# Patient Record
Sex: FEMALE | Race: WHITE | NOT HISPANIC OR LATINO | Employment: OTHER | URBAN - METROPOLITAN AREA
[De-identification: names, ages, dates, MRNs, and addresses within clinical notes are randomized per-mention and may not be internally consistent; named-entity substitution may affect disease eponyms.]

---

## 2021-03-19 DIAGNOSIS — M31.6 TEMPORAL ARTERITIS (H): Primary | ICD-10-CM

## 2021-03-19 DIAGNOSIS — Z11.59 ENCOUNTER FOR SCREENING FOR OTHER VIRAL DISEASES: ICD-10-CM

## 2021-03-20 DIAGNOSIS — Z11.59 ENCOUNTER FOR SCREENING FOR OTHER VIRAL DISEASES: ICD-10-CM

## 2021-03-20 LAB
SARS-COV-2 RNA RESP QL NAA+PROBE: NORMAL
SPECIMEN SOURCE: NORMAL

## 2021-03-20 PROCEDURE — U0005 INFEC AGEN DETEC AMPLI PROBE: HCPCS | Performed by: SURGERY

## 2021-03-20 PROCEDURE — U0003 INFECTIOUS AGENT DETECTION BY NUCLEIC ACID (DNA OR RNA); SEVERE ACUTE RESPIRATORY SYNDROME CORONAVIRUS 2 (SARS-COV-2) (CORONAVIRUS DISEASE [COVID-19]), AMPLIFIED PROBE TECHNIQUE, MAKING USE OF HIGH THROUGHPUT TECHNOLOGIES AS DESCRIBED BY CMS-2020-01-R: HCPCS | Performed by: SURGERY

## 2021-03-21 LAB
LABORATORY COMMENT REPORT: NORMAL
SARS-COV-2 RNA RESP QL NAA+PROBE: NEGATIVE
SPECIMEN SOURCE: NORMAL

## 2021-03-22 ENCOUNTER — TELEPHONE (OUTPATIENT)
Dept: OTHER | Facility: CLINIC | Age: 66
End: 2021-03-22

## 2021-03-22 RX ORDER — CEFAZOLIN SODIUM 2 G/100ML
2 INJECTION, SOLUTION INTRAVENOUS
Status: CANCELLED | OUTPATIENT
Start: 2021-03-22

## 2021-03-22 NOTE — TELEPHONE ENCOUNTER
[facility]    I called Anita Prince today to discuss her temporal artery biopsy further.  She has a history of PMR.  First noted approximately 2017 where she was treated for almost 18 months with steroids with improvement.  She now is developing very similar symptoms.  ESR= 54 and CRP= 1.46 several weeks ago.  Recent testing from last week are still pending.  Her physician started her on prednisone 50 mg daily 3/19/2021.  She is feeling better.      They requested temporal artery biopsy to rule out giant cell arteritis.  This was requested for 1 side only.  He does have some temporal pain intermittently on both sides.  We did discuss the possibility of doing the planned biopsy with frozen section and if this is negative to potentially biopsy the contralateral side for a better yield on the biopsy results and we will discuss this further tomorrow.    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  She will try to obtain the report and bring this with her.    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  She does have oral Cleocin will take this as a premedication prophylaxis prior to the biopsy tomorrow.    COVID-19 testing is negative from 3/20/2021.  This was also negative on 11/29/2020       Mega Cisneros MD

## 2021-03-23 ENCOUNTER — HOSPITAL ENCOUNTER (OUTPATIENT)
Facility: CLINIC | Age: 66
Discharge: HOME | End: 2021-03-23
Attending: SURGERY | Admitting: SURGERY
Payer: MEDICARE

## 2021-03-23 ENCOUNTER — APPOINTMENT (OUTPATIENT)
Dept: SURGERY | Facility: PHYSICIAN GROUP | Age: 66
End: 2021-03-23
Payer: MEDICARE

## 2021-03-23 VITALS
SYSTOLIC BLOOD PRESSURE: 137 MMHG | RESPIRATION RATE: 20 BRPM | DIASTOLIC BLOOD PRESSURE: 72 MMHG | OXYGEN SATURATION: 94 % | HEIGHT: 64 IN | WEIGHT: 221 LBS | BODY MASS INDEX: 37.73 KG/M2 | TEMPERATURE: 98.2 F

## 2021-03-23 DIAGNOSIS — M31.6 TEMPORAL ARTERITIS (H): ICD-10-CM

## 2021-03-23 LAB
GLUCOSE BLDC GLUCOMTR-MCNC: 181 MG/DL (ref 70–99)
POTASSIUM SERPL-SCNC: 3.4 MMOL/L (ref 3.4–5.3)

## 2021-03-23 PROCEDURE — 999N000141 HC STATISTIC PRE-PROCEDURE NURSING ASSESSMENT: Performed by: SURGERY

## 2021-03-23 PROCEDURE — 36415 COLL VENOUS BLD VENIPUNCTURE: CPT | Performed by: SURGERY

## 2021-03-23 PROCEDURE — 88305 TISSUE EXAM BY PATHOLOGIST: CPT | Mod: TC | Performed by: SURGERY

## 2021-03-23 PROCEDURE — 82962 GLUCOSE BLOOD TEST: CPT

## 2021-03-23 PROCEDURE — 84132 ASSAY OF SERUM POTASSIUM: CPT | Performed by: SURGERY

## 2021-03-23 PROCEDURE — 710N000012 HC RECOVERY PHASE 2, PER MINUTE: Performed by: SURGERY

## 2021-03-23 PROCEDURE — 88331 PATH CONSLTJ SURG 1 BLK 1SPC: CPT | Mod: TC | Performed by: SURGERY

## 2021-03-23 PROCEDURE — 88305 TISSUE EXAM BY PATHOLOGIST: CPT | Mod: 26 | Performed by: PATHOLOGY

## 2021-03-23 PROCEDURE — 250N000009 HC RX 250: Performed by: SURGERY

## 2021-03-23 PROCEDURE — 710N000010 HC RECOVERY PHASE 1, LEVEL 2, PER MIN: Performed by: SURGERY

## 2021-03-23 PROCEDURE — 272N000001 HC OR GENERAL SUPPLY STERILE: Performed by: SURGERY

## 2021-03-23 PROCEDURE — 88331 PATH CONSLTJ SURG 1 BLK 1SPC: CPT | Mod: 26 | Performed by: PATHOLOGY

## 2021-03-23 PROCEDURE — 360N000075 HC SURGERY LEVEL 2, PER MIN: Performed by: SURGERY

## 2021-03-23 RX ORDER — MOMETASONE FUROATE 1 MG/G
OINTMENT TOPICAL DAILY
COMMUNITY

## 2021-03-23 RX ORDER — ACETAMINOPHEN 325 MG/1
650 TABLET ORAL EVERY 4 HOURS PRN
Qty: 30 TABLET | Refills: 0 | Status: SHIPPED | OUTPATIENT
Start: 2021-03-23

## 2021-03-23 RX ORDER — CETIRIZINE HYDROCHLORIDE 10 MG/1
10 TABLET ORAL DAILY
COMMUNITY

## 2021-03-23 RX ORDER — ANTIARTHRITIC COMBINATION NO.2 900 MG
1 TABLET ORAL DAILY
COMMUNITY

## 2021-03-23 RX ORDER — IBUPROFEN 600 MG/1
600 TABLET, FILM COATED ORAL 3 TIMES DAILY PRN
Status: ON HOLD | COMMUNITY
End: 2022-10-27

## 2021-03-23 RX ORDER — NEOMYCIN SULFATE, POLYMYXIN B SULFATE, AND DEXAMETHASONE 3.5; 10000; 1 MG/G; [USP'U]/G; MG/G
0.5 OINTMENT OPHTHALMIC
COMMUNITY

## 2021-03-23 RX ORDER — LEVOTHYROXINE SODIUM 75 UG/1
75 TABLET ORAL DAILY
COMMUNITY

## 2021-03-23 RX ORDER — ALBUTEROL SULFATE 90 UG/1
2 AEROSOL, METERED RESPIRATORY (INHALATION) 4 TIMES DAILY PRN
COMMUNITY

## 2021-03-23 RX ORDER — ACETAMINOPHEN 325 MG/1
650 TABLET ORAL
Status: CANCELLED | OUTPATIENT
Start: 2021-03-23

## 2021-03-23 RX ORDER — BACLOFEN 10 MG/1
10 TABLET ORAL PRN
COMMUNITY

## 2021-03-23 RX ORDER — BUMETANIDE 2 MG/1
2 TABLET ORAL 2 TIMES DAILY
COMMUNITY

## 2021-03-23 RX ORDER — PREDNISONE 50 MG/1
50 TABLET ORAL DAILY
Status: ON HOLD | COMMUNITY
End: 2022-10-27

## 2021-03-23 RX ORDER — FLUTICASONE PROPIONATE AND SALMETEROL XINAFOATE 115; 21 UG/1; UG/1
2 AEROSOL, METERED RESPIRATORY (INHALATION) DAILY
Status: ON HOLD | COMMUNITY
End: 2022-10-27

## 2021-03-23 RX ORDER — EPINEPHRINE 0.3 MG/.3ML
0.3 INJECTION SUBCUTANEOUS PRN
COMMUNITY

## 2021-03-23 RX ORDER — FAMOTIDINE 20 MG
2000 TABLET ORAL DAILY
COMMUNITY

## 2021-03-23 RX ORDER — ATORVASTATIN CALCIUM 20 MG/1
20 TABLET, FILM COATED ORAL DAILY
Status: ON HOLD | COMMUNITY
End: 2022-10-27

## 2021-03-23 RX ORDER — NORETHINDRONE ACETATE AND ETHINYL ESTRADIOL .5; 2.5 MG/1; UG/1
TABLET ORAL WEEKLY
Status: ON HOLD | COMMUNITY
End: 2022-10-27

## 2021-03-23 RX ORDER — MAGNESIUM HYDROXIDE 1200 MG/15ML
LIQUID ORAL PRN
Status: DISCONTINUED | OUTPATIENT
Start: 2021-03-23 | End: 2021-03-23 | Stop reason: HOSPADM

## 2021-03-23 RX ORDER — CLINDAMYCIN HCL 300 MG
600 CAPSULE ORAL ONCE
COMMUNITY

## 2021-03-23 RX ORDER — ASPIRIN 81 MG/1
81 TABLET ORAL DAILY
COMMUNITY

## 2021-03-23 RX ORDER — SPIRONOLACTONE 25 MG/1
50 TABLET ORAL DAILY
COMMUNITY

## 2021-03-23 RX ORDER — BUPIVACAINE HYDROCHLORIDE 5 MG/ML
INJECTION, SOLUTION PERINEURAL PRN
Status: DISCONTINUED | OUTPATIENT
Start: 2021-03-23 | End: 2021-03-23 | Stop reason: HOSPADM

## 2021-03-23 RX ORDER — POTASSIUM CHLORIDE 1.5 G/1.58G
20 POWDER, FOR SOLUTION ORAL 2 TIMES DAILY
COMMUNITY

## 2021-03-23 ASSESSMENT — MIFFLIN-ST. JEOR: SCORE: 1527.45

## 2021-03-23 NOTE — BRIEF OP NOTE
[facility]    Brief Operative Note    Pre-operative diagnosis: Temporal arteritis (H) [M31.6]  Post-operative diagnosis Same as pre-operative diagnosis    Procedure: Procedure(s):  BILATERAL  TEMPORAL ARTERY BIOPSIES  Surgeon: Surgeon(s) and Role:     * Mega Cisneros - Primary     * Verito Zaman MD - Fellow - Assisting  Anesthesia: Local   Estimated blood loss: 5ml  Drains: None  Specimens:   ID Type Source Tests Collected by Time Destination   A : Left Temporal artery Tissue Artery, Temporal SURGICAL PATHOLOGY EXAM Mega Cisneros 3/23/2021  2:25 PM    B : Right Temporal artery Tissue Artery, Temporal SURGICAL PATHOLOGY EXAM Mega Cisneros 3/23/2021  3:07 PM      Findings:   No abnormalities noted..  Complications: None.  Implants: * No implants in log *

## 2021-03-23 NOTE — DISCHARGE INSTRUCTIONS
You may return to normal activity as tolerated.     48 hours after surgery, you may shower and wash your hair. If you have steri strips, leave them in place until they fall off by themselves (usually 7-10 days). If your incisions were closed by surgical glue (Dermabond), no special treatment is needed. Do not apply tape or lotions to incisions.     If you were given prescription pain medication, take as directed.  Otherwise you may use over the counter medications for pain.  If these are ineffective, please call your surgeon.      Do not drive if you are taking prescription strength pain medication.    May resume home medications as directed by your physician.    It is normal to have some temporary bruising, tenderness or numbness around your incision site. This should resolve after several days.    Follow up with your     Call your doctor if you experience any of the following:  -uncontrolled pain, bleeding or vomiting  -fever greater than 101.5 F  -purulent discharge (pus) coming from the wound  -increasing redness around the wound

## 2021-03-24 ENCOUNTER — TELEPHONE (OUTPATIENT)
Dept: OTHER | Facility: CLINIC | Age: 66
End: 2021-03-24

## 2021-03-24 LAB — COPATH REPORT: NORMAL

## 2021-03-24 NOTE — OP NOTE
Procedure Date: 03/23/2021      PREOPERATIVE DIAGNOSIS:  Temporal artery discomfort -- rule out giant cell arteritis.      POSTOPERATIVE DIAGNOSIS:  Temporal artery discomfort -- rule out giant cell arteritis.      PROCEDURE:   1.  Left temporal artery excisional biopsy with frozen section.   2.  Right temporal artery excisional biopsy.      SURGEON:  Mega Cisneros MD      ASSISTANTS: Suellen Pitts MD ([facility] resident) and Verito Zaman MD (Vascular fellow)      ANESTHESIA:  Local.      PREOPERATIVE MEDICATIONS:  Cleocin 900 mg orally.      INDICATIONS:  A 65-year-old patient has a history of polymyalgia rheumatica.  She was treated with prednisone years ago and is now having similar symptoms with elevated ESR and CRP.  She is having headaches and discomfort.  She was initiated on prednisone 50 mg daily 4 days ago, with some improvement.  It was requested that we perform a left temporal artery biopsy.  After discussion with the patient, we decided that if the temporal artery biopsy frozen section is negative on the left, then we would do the right temporal artery to increase the diagnostic yield for giant cell arteritis.  She has easily palpable temporal artery pulses bilaterally.  She comes to the operating room today under informed consent.      DESCRIPTION OF PROCEDURE:  The patient was brought to the operating room.  She was placed in a partial sitting position.  Both temporal arteries were easily palpable.      LEFT TEMPORAL ARTERY BIOPSY:  We prepped the left temporal area and ear with ChloraPrep.  After 3 minutes, drapes were applied.  This was anesthetized with 1% lidocaine.  A vertical incision was made from the hairline to the lower portion of the ear.  Dissection was carried with electrocautery until we identified a small temporal artery.  This did not appear to have any inflammatory changes.  Multiple small branches were divided between 4-0 silk suture, and this was mobilized for a length of  approximately 3.5 cm.  This was ligated proximally and distally with 3-0 silk suture and transected and sent to Pathology.  Wound was infiltrated with 0.5% Marcaine.  Subcutaneous tissue was closed with interrupted 3-0 Monocryl.  (ALLERGIC REACTION TO VICRYL IN THE PAST), and the skin was closed with 4-0 Monocryl in a subcuticular fashion, followed by Steri-Strips.  Frozen section returned no evidence of arteritis.      RIGHT TEMPORAL ARTERY EXCISIONAL BIOPSY:  We then prepped the right side of the temple area and ear in a similar fashion.  This was again anesthetized with 1% lidocaine.  A vertical incision was made.  Dissection was carried down to identify the temporal vein.  The artery was located posterior to this, and the vein was mobilized.  Again, multiple small branches were ligated on the artery with 4-0 silk suture, and this was mobilized for a length of approximately 3 cm, ligated proximally and distally with 3-0 silk sutures.  This specimen was divided and sent to Pathology for permanent pathological evaluation.  Wound was infiltrated with 0.5% Marcaine for postoperative analgesia.  Subcutaneous tissue was closed with interrupted 4-0 Monocryl and skin with 4-0 Monocryl in subcuticular fashion, followed by a Steri-Strip.      The patient tolerated both procedures well.      ESTIMATED BLOOD LOSS:  Less than 1 mL.      COMPLICATIONS:  None.      The patient will continue on her prednisone.  We will call her when the final pathology results are available in approximately 48 hours.         JOHN MOMIN MD             D: 2021   T: 2021   MT: FAVIAN      Name:     NICOLE WALSH   MRN:      [id number]        Account:        [id number]   :      1955           Procedure Date: 2021      Document: [id number]

## 2021-03-24 NOTE — TELEPHONE ENCOUNTER
[facility]    I called Anitakg Prince about her Temporal artery biopsies yesterday.  She is doing well.    Final Path-- no evidence of GCA (normal).      Mega Cisneros MD

## 2021-03-25 NOTE — RESULT ENCOUNTER NOTE
Called patient with Negative GCA results.  She is doing very well from the biopsy.  Still with some headaches last evening.  For now should continue on the prednisone follow-up with her physicians.       Mega Cisneros MD

## 2021-04-03 ENCOUNTER — HEALTH MAINTENANCE LETTER (OUTPATIENT)
Age: 66
End: 2021-04-03

## 2021-09-18 ENCOUNTER — HEALTH MAINTENANCE LETTER (OUTPATIENT)
Age: 66
End: 2021-09-18

## 2022-04-30 ENCOUNTER — HEALTH MAINTENANCE LETTER (OUTPATIENT)
Age: 67
End: 2022-04-30

## 2022-10-24 ENCOUNTER — TRANSFERRED RECORDS (OUTPATIENT)
Dept: MULTI SPECIALTY CLINIC | Facility: CLINIC | Age: 67
End: 2022-10-24

## 2022-10-24 LAB
ALBUMIN (URINE) MG/SPEC: <6 MG/L
ALT SERPL-CCNC: 28 U/L (ref 14–63)
AST SERPL-CCNC: 26 U/L (ref 15–37)
CHOLESTEROL (EXTERNAL): 197 MG/DL (ref 100–200)
CREATININE (EXTERNAL): 0.96 MG/DL (ref 0.51–0.95)
CREATININE (URINE): 114 MG/DL (ref 29–226)
GFR ESTIMATED (EXTERNAL): >60 ML/MIN/1.73M2
GLUCOSE (EXTERNAL): 140 MG/DL (ref 74–100)
HBA1C MFR BLD: 6.3 % (ref 4.8–5.6)
HDLC SERPL-MCNC: 72 MG/DL (ref 45–60)
LDL CHOLESTEROL (EXTERNAL): 94 MG/DL
NON HDL CHOLESTEROL (EXTERNAL): 125 MG/DL
POTASSIUM (EXTERNAL): 3.7 MMOL/L (ref 3.5–5.1)
TRIGLYCERIDES (EXTERNAL): 154 MG/DL (ref 35–150)
TSH SERPL-ACNC: 0.57 UIU/ML (ref 0.35–3.74)

## 2022-10-26 ENCOUNTER — ANESTHESIA EVENT (OUTPATIENT)
Dept: SURGERY | Facility: CLINIC | Age: 67
End: 2022-10-26
Payer: MEDICARE

## 2022-10-26 RX ORDER — ALLOPURINOL 100 MG/1
100 TABLET ORAL DAILY
COMMUNITY

## 2022-10-26 RX ORDER — ROSUVASTATIN CALCIUM 5 MG/1
5 TABLET, COATED ORAL DAILY
COMMUNITY

## 2022-10-26 RX ORDER — FLUCONAZOLE 150 MG/1
150 TABLET ORAL ONCE
COMMUNITY

## 2022-10-26 RX ORDER — PREDNISOLONE 5 MG/1
5 TABLET ORAL DAILY
COMMUNITY

## 2022-10-27 ENCOUNTER — HOSPITAL ENCOUNTER (OUTPATIENT)
Facility: CLINIC | Age: 67
Discharge: HOME | End: 2022-10-27
Attending: OPHTHALMOLOGY | Admitting: OPHTHALMOLOGY
Payer: MEDICARE

## 2022-10-27 ENCOUNTER — ANESTHESIA (OUTPATIENT)
Dept: SURGERY | Facility: CLINIC | Age: 67
End: 2022-10-27
Payer: MEDICARE

## 2022-10-27 VITALS
WEIGHT: 204.7 LBS | HEIGHT: 64 IN | BODY MASS INDEX: 34.95 KG/M2 | DIASTOLIC BLOOD PRESSURE: 70 MMHG | RESPIRATION RATE: 18 BRPM | OXYGEN SATURATION: 97 % | SYSTOLIC BLOOD PRESSURE: 125 MMHG | HEART RATE: 80 BPM | TEMPERATURE: 96.7 F

## 2022-10-27 LAB
ANION GAP SERPL CALCULATED.3IONS-SCNC: 9 MMOL/L (ref 3–14)
BUN SERPL-MCNC: 23 MG/DL (ref 7–30)
CALCIUM SERPL-MCNC: 8.9 MG/DL (ref 8.5–10.1)
CHLORIDE BLD-SCNC: 105 MMOL/L (ref 94–109)
CO2 SERPL-SCNC: 26 MMOL/L (ref 20–32)
CREAT SERPL-MCNC: 0.85 MG/DL (ref 0.52–1.04)
GFR SERPL CREATININE-BSD FRML MDRD: 75 ML/MIN/1.73M2
GLUCOSE BLD-MCNC: 157 MG/DL (ref 70–99)
POTASSIUM BLD-SCNC: 3 MMOL/L (ref 3.4–5.3)
SODIUM SERPL-SCNC: 140 MMOL/L (ref 133–144)

## 2022-10-27 PROCEDURE — 36415 COLL VENOUS BLD VENIPUNCTURE: CPT | Performed by: ANESTHESIOLOGY

## 2022-10-27 PROCEDURE — 999N000141 HC STATISTIC PRE-PROCEDURE NURSING ASSESSMENT: Performed by: OPHTHALMOLOGY

## 2022-10-27 PROCEDURE — 250N000009 HC RX 250: Performed by: OPHTHALMOLOGY

## 2022-10-27 PROCEDURE — 710N000012 HC RECOVERY PHASE 2, PER MINUTE: Performed by: OPHTHALMOLOGY

## 2022-10-27 PROCEDURE — 250N000011 HC RX IP 250 OP 636

## 2022-10-27 PROCEDURE — 272N000001 HC OR GENERAL SUPPLY STERILE: Performed by: OPHTHALMOLOGY

## 2022-10-27 PROCEDURE — 710N000009 HC RECOVERY PHASE 1, LEVEL 1, PER MIN: Performed by: OPHTHALMOLOGY

## 2022-10-27 PROCEDURE — 80048 BASIC METABOLIC PNL TOTAL CA: CPT | Performed by: ANESTHESIOLOGY

## 2022-10-27 PROCEDURE — 258N000003 HC RX IP 258 OP 636

## 2022-10-27 PROCEDURE — 250N000009 HC RX 250

## 2022-10-27 PROCEDURE — 370N000017 HC ANESTHESIA TECHNICAL FEE, PER MIN: Performed by: OPHTHALMOLOGY

## 2022-10-27 PROCEDURE — 360N000076 HC SURGERY LEVEL 3, PER MIN: Performed by: OPHTHALMOLOGY

## 2022-10-27 RX ORDER — TETRACAINE HYDROCHLORIDE 5 MG/ML
SOLUTION OPHTHALMIC
Status: DISCONTINUED
Start: 2022-10-27 | End: 2022-10-27 | Stop reason: HOSPADM

## 2022-10-27 RX ORDER — LIDOCAINE HCL/EPINEPHRINE/PF 2%-1:200K
VIAL (ML) INJECTION PRN
Status: DISCONTINUED | OUTPATIENT
Start: 2022-10-27 | End: 2022-10-27 | Stop reason: HOSPADM

## 2022-10-27 RX ORDER — BALANCED SALT SOLUTION 6.4; .75; .48; .3; 3.9; 1.7 MG/ML; MG/ML; MG/ML; MG/ML; MG/ML; MG/ML
SOLUTION OPHTHALMIC PRN
Status: DISCONTINUED | OUTPATIENT
Start: 2022-10-27 | End: 2022-10-27 | Stop reason: HOSPADM

## 2022-10-27 RX ORDER — ERYTHROMYCIN 5 MG/G
OINTMENT OPHTHALMIC PRN
Status: DISCONTINUED | OUTPATIENT
Start: 2022-10-27 | End: 2022-10-27 | Stop reason: HOSPADM

## 2022-10-27 RX ORDER — DEXAMETHASONE SODIUM PHOSPHATE 4 MG/ML
INJECTION, SOLUTION INTRA-ARTICULAR; INTRALESIONAL; INTRAMUSCULAR; INTRAVENOUS; SOFT TISSUE PRN
Status: DISCONTINUED | OUTPATIENT
Start: 2022-10-27 | End: 2022-10-27

## 2022-10-27 RX ORDER — TETRACAINE HYDROCHLORIDE 5 MG/ML
SOLUTION OPHTHALMIC PRN
Status: DISCONTINUED | OUTPATIENT
Start: 2022-10-27 | End: 2022-10-27 | Stop reason: HOSPADM

## 2022-10-27 RX ORDER — LIDOCAINE HCL/EPINEPHRINE/PF 2%-1:200K
VIAL (ML) INJECTION
Status: DISCONTINUED
Start: 2022-10-27 | End: 2022-10-27 | Stop reason: HOSPADM

## 2022-10-27 RX ORDER — BALANCED SALT SOLUTION 6.4; .75; .48; .3; 3.9; 1.7 MG/ML; MG/ML; MG/ML; MG/ML; MG/ML; MG/ML
SOLUTION OPHTHALMIC
Status: DISCONTINUED
Start: 2022-10-27 | End: 2022-10-27 | Stop reason: HOSPADM

## 2022-10-27 RX ORDER — ERYTHROMYCIN 5 MG/G
OINTMENT OPHTHALMIC
Status: DISCONTINUED
Start: 2022-10-27 | End: 2022-10-27 | Stop reason: HOSPADM

## 2022-10-27 RX ORDER — PROPOFOL 10 MG/ML
INJECTION, EMULSION INTRAVENOUS PRN
Status: DISCONTINUED | OUTPATIENT
Start: 2022-10-27 | End: 2022-10-27

## 2022-10-27 RX ORDER — SODIUM CHLORIDE, SODIUM LACTATE, POTASSIUM CHLORIDE, CALCIUM CHLORIDE 600; 310; 30; 20 MG/100ML; MG/100ML; MG/100ML; MG/100ML
INJECTION, SOLUTION INTRAVENOUS CONTINUOUS PRN
Status: DISCONTINUED | OUTPATIENT
Start: 2022-10-27 | End: 2022-10-27

## 2022-10-27 RX ORDER — ONDANSETRON 2 MG/ML
INJECTION INTRAMUSCULAR; INTRAVENOUS PRN
Status: DISCONTINUED | OUTPATIENT
Start: 2022-10-27 | End: 2022-10-27

## 2022-10-27 RX ORDER — LIDOCAINE HYDROCHLORIDE 20 MG/ML
INJECTION, SOLUTION INFILTRATION; PERINEURAL PRN
Status: DISCONTINUED | OUTPATIENT
Start: 2022-10-27 | End: 2022-10-27

## 2022-10-27 RX ADMIN — MIDAZOLAM 2 MG: 1 INJECTION INTRAMUSCULAR; INTRAVENOUS at 07:33

## 2022-10-27 RX ADMIN — PROPOFOL 20 MG: 10 INJECTION, EMULSION INTRAVENOUS at 07:53

## 2022-10-27 RX ADMIN — SODIUM CHLORIDE, POTASSIUM CHLORIDE, SODIUM LACTATE AND CALCIUM CHLORIDE: 600; 310; 30; 20 INJECTION, SOLUTION INTRAVENOUS at 07:33

## 2022-10-27 RX ADMIN — PROPOFOL 40 MG: 10 INJECTION, EMULSION INTRAVENOUS at 07:37

## 2022-10-27 RX ADMIN — PROPOFOL 10 MG: 10 INJECTION, EMULSION INTRAVENOUS at 07:41

## 2022-10-27 RX ADMIN — PROPOFOL 10 MG: 10 INJECTION, EMULSION INTRAVENOUS at 07:43

## 2022-10-27 RX ADMIN — DEXAMETHASONE SODIUM PHOSPHATE 4 MG: 4 INJECTION, SOLUTION INTRA-ARTICULAR; INTRALESIONAL; INTRAMUSCULAR; INTRAVENOUS; SOFT TISSUE at 07:39

## 2022-10-27 RX ADMIN — LIDOCAINE HYDROCHLORIDE 40 MG: 20 INJECTION, SOLUTION INFILTRATION; PERINEURAL at 07:37

## 2022-10-27 RX ADMIN — ONDANSETRON 4 MG: 2 INJECTION INTRAMUSCULAR; INTRAVENOUS at 07:39

## 2022-10-27 RX ADMIN — PROPOFOL 10 MG: 10 INJECTION, EMULSION INTRAVENOUS at 07:40

## 2022-10-27 ASSESSMENT — COPD QUESTIONNAIRES: COPD: 1

## 2022-10-27 ASSESSMENT — ACTIVITIES OF DAILY LIVING (ADL)
ADLS_ACUITY_SCORE: 35
ADLS_ACUITY_SCORE: 35

## 2022-10-27 NOTE — ANESTHESIA CARE TRANSFER NOTE
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    Anesthesia Type:   MAC     Note:    Oropharynx: oropharynx clear of all foreign objects  Level of Consciousness: awake  Oxygen Supplementation: room air    Independent Airway: airway patency satisfactory and stable  Dentition: dentition unchanged  Vital Signs Stable: post-procedure vital signs reviewed and stable  Report to RN Given: handoff report given  Patient transferred to: PACU    Handoff Report: Identifed the Patient, Identified the Reponsible Provider, Reviewed the pertinent medical history, Discussed the surgical course, Reviewed Intra-OP anesthesia mangement and issues during anesthesia, Set expectations for post-procedure period and Allowed opportunity for questions and acknowledgement of understanding      Vitals:  Vitals Value Taken Time   BP     Temp     Pulse     Resp     SpO2         Electronically Signed By: NA Daniels CRNA  October 27, 2022  8:14 AM

## 2022-10-27 NOTE — ANESTHESIA POSTPROCEDURE EVALUATION
Patient: Anita Prince    Procedure: Procedure(s):  INTERNAL PTOSIS REPAIR BILATERAL UPPER LIDS       Anesthesia Type:  MAC    Note:  Disposition: Outpatient   Postop Pain Control: Uneventful            Sign Out: Well controlled pain   PONV: No   Neuro/Psych: Uneventful            Sign Out: Acceptable/Baseline neuro status   Airway/Respiratory: Uneventful            Sign Out: Acceptable/Baseline resp. status   CV/Hemodynamics: Uneventful            Sign Out: Acceptable CV status; No obvious hypovolemia; No obvious fluid overload   Other NRE: NONE   DID A NON-ROUTINE EVENT OCCUR? No           Last vitals:  Vitals Value Taken Time   /67 10/27/22 0900   Temp 35.9  C (96.7  F) 10/27/22 0815   Pulse 79 10/27/22 0901   Resp 22 10/27/22 0901   SpO2 97 % 10/27/22 0900   Vitals shown include unvalidated device data.    Electronically Signed By: Josué Reeves MD  October 27, 2022  1:55 PM

## 2022-10-27 NOTE — OP NOTE
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 On exam the patient is noted to have myogenic ptosis.  The patient has good levator function and lagophthalmos. Risks, benefits, and alternatives were discussed. Potential complications were also discussed. The patient understood and wished to proceed.    After obtaining informed consent the patient was brought to the ambulatory surgery center where the patient was placed on the surgical table in the supine position. Using IV standby local anesthetic was infiltrated. A mixture of 2% lidocaine with epinephrine and 0.5% Marcaine with epinephrine was used. Patient was prepped and draped in the usual sterile fashion.    The procedure was performed in a symmetric fashion and would be described for one side. In this procedure, a 6-0 plain suture was tied laterally to the orbicularis oculi muscle through the skin and then passed through the lid and out the palpebral conjunctiva laterally. A 4-0 silk suture was placed 3-4 mm posterior to the margin to provide traction for the upper lid. The upper eyelid was everted on a Desmarres eyelid retractor. Marks were placed 1/3 and 2/13 the distance of the tarsus along the tarsoconjunctival edge. From those marks, two additional marks where placed going onto the conjunctiva 4 mm for the right and 4 mm for the left, equalling a resection of 8 mm for the right and 8 mm for the left in total. Two locking forceps where placed at the marked conjunctival  locations and lifted. A Putterman ptosis clamp was used to grab the conjunctiva and Gong's muscle at the preplaced marks.  The 6-0 Plain suture was sutured in a continuous fashion from lateral to medial directly underneath the Putterman clamp.  A #15 Bard-Hugh blade was used to resect the conjunctiva and Gong's muscle which was held in the Putterman clamp by carefully passing it directly underneath the clamp but over the suture.  After the resection was completed, the 6-0 plain suture was checked to make sure that it was still intact.  Then, it was passed anteriorly through the eyelid medially and secured to the superficial orbicularis oculi muscle through the skin. The same procedure was performed on the contralateral eye without complications.    The patient's face was cleaned and antibiotic ointment was placed onto the wound. The patient tolerated the procedure well and was taken to the recovery area in good condition. Following the recovery room protocol, the patient was discharged in the presence of a responsible adult.

## 2022-10-27 NOTE — PROVIDER NOTIFICATION
Pt worried about Tylenol intake and meds she already takes.  Wanted me to talk to surgeon about ointment, drops and pain relief.  Surgeon states she can take the maxitrol drops now, use ice, and tylenol.  Surgeon states will call tomorrow to see how pain is.  Patient took her own maxitrol.

## 2022-10-27 NOTE — DISCHARGE INSTRUCTIONS
POST-SURGERY INSTRUCTIONS - STANDARD  DR BRYCE ROMO    NAME:       :          POST-OPERATIVE APPOINTMENTS:       DAYS ONE & TWO POST-SURGERY  [Please follow these instructions for 48 hours after surgery]    Bruising and Swelling: Remember that swelling and bruising can increase for the first 24-48 hours. While you should expect swelling, keeping swelling to a minimum will optimize your healing results.    Positioning: Remain in a reclined position (30-45 degrees) for the first 48 hours after surgery. Keeping the head above the heart is important to reduce swelling and shortens recovery time.    Activity: For the first 48 hours, we recommend you keep your activity to a minimum, only getting up to use the restroom and sitting up for meals. Keeping the heart rate and blood pressure low will reduce swelling and bruising.    Cold Compresses: Put 4x4 gauze pads (do not need to be sterile) in a bowl of cold tap water and keep in the refrigerator. Remove from fridge, place ice cubes into bowl of water to help keep cool and place next to you for easy access. When ready to use, wring out gauze pad and place one over each operative eye. Leave in place for 15 minutes on, then 15 minutes off for 48 hours. (Goal: apply cool for duration of 15 minutes, if your gauze is warming up before 15 minutes, replace warm gauze with new cool gauze to complete the 15 minute duration). You do not have to use them at night. Use new gauze each time. Cold gauze pads help soothe pain and itching and keep swelling to a minimum.   (Do NOT use ice packs as these are too cold and too heavy for the sensitive skin around the eyes.)      Preventing Infection:  Use the prescribed ophthalmic ointment over the surgical site and inside the eye (only when directed for inside the eye). Apply the ointment with a Q-tip. Use a rolling motion to cover any exposed stitches. If surgery is done on inside of the eyelid and instructed by doctor's office place a  small dab of the ointment about the size of a grain of rice, in the inner corner of the eye. The heat of the eye and blinking motion will melt the ointment and draw it across the whole eye. It is normal for the vision to be blurry while using the ointment as it is a thick consistency. Use the ointment 4 times a day: breakfast, lunch, dinner, and bedtime, for 7 days.    Eye drops: Use preservative-free lubricating eye drops at least 4 times a day for 48 hours, increase frequency as needed for your comfort. These drops can be used as often as you like to alleviate any dry eye, itchiness, foreign-body sensation, brunilda feeling, etc. Avoid using lubricating eye drops with preservatives, as they can irritate the eye after surgery.     Itch: Surgical site itch is a normal post-operative expectation. Cold compresses and ointment can help. Over the counter diphenhydramine (Benadryl) taken as the box directed can also assist.    Pain Medication: Pain after this procedure is generally well controlled with extra-strength Acetaminophen (Tylenol). Take Acetaminophen as directed. Do not exceed the acetaminophen maximum total daily dose (4000 mg/day). Do not take more than one product that contains acetaminophen at any given time. Examples of prescription products that contain acetaminophen include hydrocodone with acetaminophen (Vicodin), oxycodone with acetaminophen (Percocet) and acetaminophen with codeine (Tylenol #3). Avoid other pain medications that could cause bleeding such as aspirin, Ibuprofen (Advil) and Naproxen (Aleve) unless otherwise directed by your doctor.               (Continued on reverse)  Diet: For the first two days after surgery, avoid foods that require excessive chewing, such as steak or bagels. Excessive chewing can stress the sutures.  No alcohol for 24 hours post-surgery. The combination of anesthesia and alcohol may cause nausea and vomiting.     Showering: You may resume showering 1 day after your  surgery. It is okay for the surgical site to get wet but avoid direct forceful pressure from the shower touching the area.  A clean wound is a happy wound, so please make sure to keep the surgical site clean and free of dried drainage. We recommend using a Q-tip dipped in warm water to clean around the eyes. Make sure to use a gentle rolling motion to avoid disrupting the sutures and incisions.      Arnica (optional): Resume taking Arnica for 4 days following your surgery. Take as directed on bottle.    WHAT YOU CAN EXPECT  Dry eyes (for about one month)  Itchiness (in and around your stitches)  Tightness (from swelling)  Red tinted tears (for the first few days)  Blurry vision (from the ointment and dry eyes)  Bruising (typically lasts 2-3 weeks)  One side more swollen or bruised than the other         WHAT YOU SHOULD NOT EXPERIENCE  Severe pain  Severe bleeding  Intolerable itching not relieved by cool compresses    These symptoms are rare, but if you do experience them, call our office at [phone].  Someone is available 24 hours a day. If it is after business hours, you will reach an answering service that will page the doctor on call.     FOR TWO WEEKS POST-SURGERY  Continue using preservative free lubricating drops 4 times a day or more as needed for two weeks.   Use cold compresses as needed for your comfort for up to 10 days after surgery.  Do not use Aspirin, Ibuprofen (Advil), Naproxen (Aleve) or other anticoagulants unless otherwise directed by your physician. Resume vitamins and supplements 7 days after surgery.  Keep swelling to a minimum:  -Avoid activity that will elevate your heart rate such as gym activity, running, climbing stairs, etc.   (It is okay to take a leisurely paced walk starting day 3 after surgery)  -Do not lift objects greater than 10 pounds.  -Do not bend down at the waist to lift objects, golf, garden,  children/pets.  To reduce the risk of infection:  -Do not wear soft  contact lenses for 2 weeks. Do not use hard contacts lenses for a period of 4 weeks.  -Do not wear makeup anywhere near the surgical site, wash hands often.  Minimize sun exposure to surgical site for 1 year minimum. Seek shade during peak hours (10 AM - 4 PM), wear a hat (a wide brim hat that protects your full face), UV-absorbent sunglasses (block both UVA and UVB rays), use sunscreen (SPF 30+, as directed on bottle).            Same Day Surgery Discharge Instructions for  Sedation and General Anesthesia     It's not unusual to feel dizzy, light-headed or faint for up to 24 hours after surgery or while taking pain medication.  If you have these symptoms: sit for a few minutes before standing and have someone assist you when you get up to walk or use the bathroom.    You should rest and relax for the next 24 hours. We recommend you make arrangements to have an adult stay with you for at least 24 hours after your discharge.  Avoid hazardous and strenuous activity.    DO NOT DRIVE any vehicle or operate mechanical equipment for 24 hours following the end of your surgery.  Even though you may feel normal, your reactions may be affected by the medication you have received.    Do not drink alcoholic beverages for 24 hours following surgery.     Slowly progress to your regular diet as you feel able. It's not unusual to feel nauseated and/or vomit after receiving anesthesia.  If you develop these symptoms, drink clear liquids (apple juice, ginger ale, broth, 7-up, etc. ) until you feel better.  If your nausea and vomiting persists for 24 hours, please notify your surgeon.      All narcotic pain medications, along with inactivity and anesthesia, can cause constipation. Drinking plenty of liquids and increasing fiber intake will help.    For any questions of a medical nature, call your surgeon.    Do not make important decisions for 24 hours.    If you had general anesthesia, you may have a sore throat for a couple of days  related to the breathing tube used during surgery.  You may use Cepacol lozenges to help with this discomfort.  If it worsens or if you develop a fever, contact your surgeon.     If you feel your pain is not well managed with the pain medications prescribed by your surgeon, please contact your surgeon's office to let them know so they can address your concerns.           **If you have questions or concerns about your procedure,   call Dr. Bailey at .*

## 2022-10-27 NOTE — ANESTHESIA PREPROCEDURE EVALUATION
Anesthesia Pre-Procedure Evaluation    Patient: Anita Prince   MRN: [id number] : 1955        Procedure : Procedure(s):  INTERNAL PTOSIS REPAIR BILATERAL UPPER LIDS          Past Medical History:   Diagnosis Date     ASD (atrial septal defect)      Bilateral carpal tunnel syndrome      Bilateral leg edema      CAD (coronary artery disease)      COPD (chronic obstructive pulmonary disease) (H)      Diabetes (H)     type II d/t predinsone     Drug-induced osteopenia      DVT (deep venous thrombosis) (H)     LOWER EXT     Elevated blood uric acid level      HLD (hyperlipidemia)      HTN (hypertension)      Hypothyroidism      Irritable bowel syndrome      Lid lag      Madden neuroma      Obesity      PFO (patent foramen ovale)      Plaque psoriasis      Positive P-ANCA titer      Primary immunodeficiency disorder (H)      Rosacea      Shortness of breath      Thyroid disease      Type 2 diabetes mellitus (H)      Uncomplicated asthma      Yeast infection of the vagina       Past Surgical History:   Procedure Laterality Date     ARTHROPLASTY HIP       BIOPSY      R. excisional breast biopsy     BIOPSY ARTERY TEMPORAL Bilateral 2021    Procedure: BILATERAL  TEMPORAL ARTERY BIOPSIES;  Surgeon: Mega Cisneros;  Location: [facility]     COLPOSCOPY       ENT SURGERY      sinus surgery x3     ENT SURGERY  8    tonsillectomy and adenoidectomy     NASAL SEPTUM SURGERY       ORTHOPEDIC SURGERY  2001    R. hip replacement     ORTHOPEDIC SURGERY  2016    right rotator cuff repair     ORTHOPEDIC SURGERY  2017, 2018    14 incision for Dupetyns, carpal tunnel      ORTHOPEDIC SURGERY      madden's neurmoma removed left foot     SINUS SURGERY       SOFT TISSUE SURGERY      exicision of histocytomas     TONSILLECTOMY       TOOTH EXTRACTION        Allergies   Allergen Reactions     Canagliflozin      Ether      Dizziness, passing out     Formaldehyde      Dizziness, passing out     Gadolinium      Gadolinium  containing contrast media-can't take  Vasovagal reaction     Hydrocodone Itching     Indomethacin      Severely depressed     Metformin      Bloody diarrhea     Suture      Ethicon-yellow/gray drainage, didn't heal well    Tolerated clear nylon filament     Exenatide Rash     Insulin Detemir Rash     Losartan Rash     Oxycodone Itching and Rash     Semaglutide Rash     Sitagliptin Rash     Sulfamethoxazole-Trimethoprim Rash     Torsemide Rash      Social History     Tobacco Use     Smoking status: Former     Packs/day: 0.50     Types: Cigarettes     Quit date: 1/1/2019     Years since quitting: 3.8     Smokeless tobacco: Never     Tobacco comments:     0.5ppd x30 years   Substance Use Topics     Alcohol use: Not Currently      Wt Readings from Last 1 Encounters:   10/27/22 92.9 kg (204 lb 11.2 oz)        Anesthesia Evaluation   Pt has had prior anesthetic.         ROS/MED HX  ENT/Pulmonary:     (+) asthma COPD,     Neurologic:       Cardiovascular:     (+) hypertension--CAD ---congenital heart disease remote ASD.     METS/Exercise Tolerance:     Hematologic:       Musculoskeletal:       GI/Hepatic:       Renal/Genitourinary:       Endo:     (+) type II DM, thyroid problem, Obesity,     Psychiatric/Substance Use:       Infectious Disease:       Malignancy:       Other:            Physical Exam    Airway        Mallampati: II    Neck ROM: full     Respiratory Devices and Support         Dental       (+) caps      Cardiovascular   cardiovascular exam normal          Pulmonary   pulmonary exam normal                OUTSIDE LABS:  CBC: No results found for: WBC, HGB, HCT, PLT  BMP:   Lab Results   Component Value Date    POTASSIUM 3.4 03/23/2021     COAGS: No results found for: PTT, INR, FIBR  POC:   Lab Results   Component Value Date     (H) 03/23/2021     HEPATIC: No results found for: ALBUMIN, PROTTOTAL, ALT, AST, GGT, ALKPHOS, BILITOTAL, BILIDIRECT, GATO  OTHER: No results found for: PH, LACT, A1C, MONIK, PHOS,  MAG, LIPASE, AMYLASE, TSH, T4, T3, CRP, SED    Anesthesia Plan    ASA Status:  3   NPO Status:  NPO Appropriate    Anesthesia Type: MAC.     - Reason for MAC: immobility needed, straight local not clinically adequate              Consents    Anesthesia Plan(s) and associated risks, benefits, and realistic alternatives discussed. Questions answered and patient/representative(s) expressed understanding.    - Discussed:     - Discussed with:  Patient         Postoperative Care    Pain management: IV analgesics.   PONV prophylaxis: Ondansetron (or other 5HT-3)     Comments:                Josué Reeves MD

## 2022-11-19 ENCOUNTER — HEALTH MAINTENANCE LETTER (OUTPATIENT)
Age: 67
End: 2022-11-19

## 2023-04-15 ENCOUNTER — HEALTH MAINTENANCE LETTER (OUTPATIENT)
Age: 68
End: 2023-04-15

## 2023-06-01 ENCOUNTER — HEALTH MAINTENANCE LETTER (OUTPATIENT)
Age: 68
End: 2023-06-01

## 2024-06-16 ENCOUNTER — HEALTH MAINTENANCE LETTER (OUTPATIENT)
Age: 69
End: 2024-06-16

## 2025-04-19 ENCOUNTER — HEALTH MAINTENANCE LETTER (OUTPATIENT)
Age: 70
End: 2025-04-19

## 2025-06-21 ENCOUNTER — HEALTH MAINTENANCE LETTER (OUTPATIENT)
Age: 70
End: 2025-06-21

## (undated) DEVICE — PACK OCULOPLATIC SEN15OCFSD

## (undated) DEVICE — DRSG STERI STRIP 1/2X4" R1547

## (undated) DEVICE — SU VICRYL 5-0 P-3 18" UND J493H

## (undated) DEVICE — SU SILK 4-0 TIE 24" SA73H

## (undated) DEVICE — LINEN TOWEL PACK X5 5464

## (undated) DEVICE — DRAPE MINOR PROCEDURE LAP 29496

## (undated) DEVICE — SU PLAIN 6-0 G-1DA 18" 770G

## (undated) DEVICE — NDL 19GA 1.5"

## (undated) DEVICE — SU VICRYL 3-0 SH 27" J316H

## (undated) DEVICE — ESU PENCIL W/SMOKE EVAC CVPLP2000

## (undated) DEVICE — NDL 27GA 1.25" 305136

## (undated) DEVICE — NDL 25GA 5/8" 305122

## (undated) DEVICE — ESU ELEC BLADE NN-STCK PLUMEPEN PRO 360D 10FT PLPRO4020

## (undated) DEVICE — ESU ELEC NDL 1" E1552

## (undated) DEVICE — SOL WATER IRRIG 1000ML BOTTLE 2F7114

## (undated) DEVICE — SU PROLENE 4-0 RB-1DA 36" 8557H

## (undated) DEVICE — GLOVE PROTEXIS W/NEU-THERA 7.5  2D73TE75

## (undated) DEVICE — PEN MARKING SKIN FINE 31145942

## (undated) DEVICE — EYE PREP BETADINE 5% SOLUTION 30ML 0065-0411-30

## (undated) DEVICE — DECANTER VIAL 2006S

## (undated) DEVICE — PACK MINOR SBA15MIFSE

## (undated) DEVICE — ESU GROUND PAD UNIVERSAL W/O CORD

## (undated) RX ORDER — PROPOFOL 10 MG/ML
INJECTION, EMULSION INTRAVENOUS
Status: DISPENSED
Start: 2022-10-27

## (undated) RX ORDER — BUPIVACAINE HYDROCHLORIDE 5 MG/ML
INJECTION, SOLUTION EPIDURAL; INTRACAUDAL
Status: DISPENSED
Start: 2021-03-23

## (undated) RX ORDER — LIDOCAINE HYDROCHLORIDE 20 MG/ML
INJECTION, SOLUTION EPIDURAL; INFILTRATION; INTRACAUDAL; PERINEURAL
Status: DISPENSED
Start: 2022-10-27

## (undated) RX ORDER — ONDANSETRON 2 MG/ML
INJECTION INTRAMUSCULAR; INTRAVENOUS
Status: DISPENSED
Start: 2022-10-27

## (undated) RX ORDER — LIDOCAINE HYDROCHLORIDE 10 MG/ML
INJECTION, SOLUTION INFILTRATION; PERINEURAL
Status: DISPENSED
Start: 2021-03-23

## (undated) RX ORDER — CEFAZOLIN SODIUM/WATER 2 G/20 ML
SYRINGE (ML) INTRAVENOUS
Status: DISPENSED
Start: 2022-10-27